# Patient Record
Sex: MALE | Employment: FULL TIME | URBAN - METROPOLITAN AREA
[De-identification: names, ages, dates, MRNs, and addresses within clinical notes are randomized per-mention and may not be internally consistent; named-entity substitution may affect disease eponyms.]

---

## 2022-10-21 ENCOUNTER — ANCILLARY PROCEDURE (OUTPATIENT)
Dept: ULTRASOUND IMAGING | Facility: CLINIC | Age: 42
End: 2022-10-21
Attending: PHYSICIAN ASSISTANT
Payer: COMMERCIAL

## 2022-10-21 ENCOUNTER — TRANSFERRED RECORDS (OUTPATIENT)
Dept: HEALTH INFORMATION MANAGEMENT | Facility: CLINIC | Age: 42
End: 2022-10-21

## 2022-10-21 DIAGNOSIS — R10.30 ABDOMINAL PAIN, LOWER: ICD-10-CM

## 2022-10-21 LAB
ALT SERPL-CCNC: 24 U/L (ref 16–63)
AST SERPL-CCNC: 18 U/L (ref 0–55)
CREATININE (EXTERNAL): 0.82 MG/DL (ref 0.7–1.3)
GLUCOSE (EXTERNAL): 75 MG/DL (ref 70–124)
POTASSIUM (EXTERNAL): 4.2 MMOL/L (ref 3.4–5.3)

## 2022-10-21 PROCEDURE — 76857 US EXAM PELVIC LIMITED: CPT | Mod: GC | Performed by: RADIOLOGY

## 2022-10-25 ENCOUNTER — MEDICAL CORRESPONDENCE (OUTPATIENT)
Dept: HEALTH INFORMATION MANAGEMENT | Facility: CLINIC | Age: 42
End: 2022-10-25

## 2022-10-25 ENCOUNTER — TRANSFERRED RECORDS (OUTPATIENT)
Dept: HEALTH INFORMATION MANAGEMENT | Facility: CLINIC | Age: 42
End: 2022-10-25

## 2022-10-26 ENCOUNTER — TRANSCRIBE ORDERS (OUTPATIENT)
Dept: OTHER | Age: 42
End: 2022-10-26

## 2022-10-26 DIAGNOSIS — R10.30 GROIN PAIN: Primary | ICD-10-CM

## 2022-11-07 NOTE — TELEPHONE ENCOUNTER
MEDICAL RECORDS REQUEST   [facility]  Urology Clinic  [address] [place] [address]  PHONE: [phone]  Fax: [phone]        FUTURE VISIT INFORMATION                                                   Vinay Cates, : 1980 scheduled for future visit at [facility]    APPOINTMENT INFORMATION:    Date: 11/15/2022    Provider:  Sarah Persaud CNP    Reason for Visit/Diagnosis: Discomfort With Ejaculation + Abdominal Discomfort    REFERRAL INFORMATION:    Referring provider:  Jax ECKERT @ [facility]    RECORDS REQUESTED FOR VISIT                                                     NOTES  STATUS/DETAILS   OFFICE NOTE from referring provider MEDIA TAB yes, 10/24/2022 -- Jax ECKERT @ [facility]   MEDICATION LIST  yes   LABS     URINALYSIS (UA)  no   IMAGING (IMAGES & REPORT)  yes, 10/21/2022 -- [facility]     PRE-VISIT CHECKLIST      Record collection complete Yes   Appointment appropriately scheduled           (right time/right provider) Yes   Joint diagnostic appointment coordinated correctly          (ensure right order & amount of time) Yes   MyChart activation Yes   Questionnaire complete If no, please explain PENDING

## 2022-11-08 ENCOUNTER — PRE VISIT (OUTPATIENT)
Dept: UROLOGY | Facility: CLINIC | Age: 42
End: 2022-11-08

## 2022-11-08 NOTE — TELEPHONE ENCOUNTER
Reason for visit: consult      Dx/Hx/Sx: pelvic pain with activity and ejaculation     Records/imaging/labs/orders: in epic     At Rooming: standard

## 2022-11-15 ENCOUNTER — PRE VISIT (OUTPATIENT)
Dept: UROLOGY | Facility: CLINIC | Age: 42
End: 2022-11-15

## 2022-11-15 ENCOUNTER — OFFICE VISIT (OUTPATIENT)
Dept: UROLOGY | Facility: CLINIC | Age: 42
End: 2022-11-15
Payer: COMMERCIAL

## 2022-11-15 VITALS
HEART RATE: 68 BPM | SYSTOLIC BLOOD PRESSURE: 152 MMHG | BODY MASS INDEX: 27.43 KG/M2 | DIASTOLIC BLOOD PRESSURE: 91 MMHG | HEIGHT: 68 IN | WEIGHT: 181 LBS

## 2022-11-15 DIAGNOSIS — R10.2 PELVIC PAIN: ICD-10-CM

## 2022-11-15 PROCEDURE — 99203 OFFICE O/P NEW LOW 30 MIN: CPT | Performed by: NURSE PRACTITIONER

## 2022-11-15 RX ORDER — SILDENAFIL 50 MG/1
TABLET, FILM COATED ORAL
COMMUNITY
Start: 2022-10-21

## 2022-11-15 ASSESSMENT — PAIN SCALES - GENERAL: PAINLEVEL: NO PAIN (0)

## 2022-11-15 NOTE — PROGRESS NOTES
Assessment and Plan:     42 year old male who has been experiencing left lower pelvic discomfort during ejaculation, which quickly ceases afterward, which has been present for the past several weeks. Recent pelvic US negative for hernia, and no signs of this on physical exam today. Comprehensive outside labs reviewed today, which were unremarkable. Suspect a muscle strain and recommend ongoing monitoring over the next 1-2 months, as these can heal slowly. He will notify me of his progress over time. If this pain continues, could consider obtaining additional imaging, including CT A/P. If pain does eventually resolve, he will follow up as needed.     Sarah Persaud, CNP  Department of Urology           Chief Complaint:   Pelvic pain          History of Present Illness:    Vinay Cates is a 42 year old male who presents for consult regarding left pelvic discomfort/pressure with ejaculation. This has been present for the past ~6 weeks. He describes this as a pressure sensation that occurs just prior to and during ejaculation, but then immediately ceases. No testicular pain or swelling, and no penile pain. No hematospermia. Some increased frequency and urgency of urination. In a monogamous relationship with wife and no concern for STIs/infection exposure.     He had a pelvic ultrasound done for this discomfort last month and was negative for hernia. Comprehensive labs done through [facility] at that time were unremarkable, including UA, PSA, CMP and CBC.     He is a new father and welcomed his first child, a daughter, three weeks ago.         Past Medical History:   No past medical history on file.         Past Surgical History:   No past surgical history on file.         Medications     Current Outpatient Medications   Medication     Multiple Vitamin (MULTIVITAMIN ADULT PO)     sildenafil (VIAGRA) 50 MG tablet     No current facility-administered medications for this visit.            Allergies:  "  Penicillins         Review of Systems:  From intake questionnaire   Negative 14 system review except as noted on HPI, nurse's note.         Physical Exam:   Patient is a 42 year old  male   Vitals: Blood pressure (!) 152/91, pulse 68, height 1.727 m (5' 8\"), weight 82.1 kg (181 lb).  General Appearance Adult: Alert, no acute distress, oriented  Lungs: no respiratory distress, or pursed lip breathing  Heart: No obvious jugular venous distension present  Abdomen: soft, nontender, no organomegaly or masses; no evidence of hernia Body mass index is 27.52 kg/m .  : deferred      Labs and Pathology:    I personally reviewed all applicable laboratory data from [facility] records and went over findings with patient       Imaging:    I personally reviewed all applicable imaging and went over findings with patient.  Significant for:    Results for orders placed or performed in visit on 10/21/22   US Pelvic Limited    Narrative    Exam: US PELVIC LIMITED, 10/21/2022 6:35 PM    Indication: looking for bilateral hernia; Abdominal pain, lower    Comparison: CT abdomen and pelvis, 2/13/2011    Findings:   Targeted grayscale and color Doppler sonographic examination of  bilateral groins demonstrated no sonographic evidence of herniation.  Benign-appearing lymph nodes in the left groin.      Impression    Impression: No sonographic evidence of inguinal hernia.    I have personally reviewed the examination and initial interpretation  and I agree with the findings.    NIGEL FISHER DO         SYSTEM ID:  [id number]     "

## 2022-11-15 NOTE — PATIENT INSTRUCTIONS
UROLOGY CLINIC VISIT PATIENT INSTRUCTIONS    Was very nice meeting you today! Keep an eye on symptoms for now and let me know how you're doing on MyChart. If symptoms remain and do not improve over time, could consider additional imaging.     If you have any issues, questions or concerns in the meantime, do not hesitate to contact us at [phone] or via [facility].     Sarah Persaud, CNP  Department of Urology

## 2022-11-15 NOTE — NURSING NOTE
"Chief Complaint   Patient presents with     Consult     Discomfort in lower abdomen with ejaculation and after physical activity, some urinary frequency both for past 6-8 weeks per pt       Blood pressure (!) 152/91, pulse 68, height 1.727 m (5' 8\"), weight 82.1 kg (181 lb). Body mass index is 27.52 kg/m .    There is no problem list on file for this patient.      Allergies   Allergen Reactions     Penicillins      vomiting       Current Outpatient Medications   Medication Sig Dispense Refill     Multiple Vitamin (MULTIVITAMIN ADULT PO)        sildenafil (VIAGRA) 50 MG tablet TAKE ONE Tablet (50mg) BY MOUTH EVERY DAY AS NEEDED         Social History     Tobacco Use     Smoking status: Former     Types: Cigarettes     Quit date:      Years since quittin.8     Smokeless tobacco: Never       Niurkaatiya Lee  11/15/2022  4:12 PM  "

## 2022-11-15 NOTE — LETTER
11/15/2022       RE: Vinay Cates  [address] [place] [address]     Dear Colleague,    Thank you for referring your patient, Vinay Cates, to the [facility] [place] [facility]. Please see a copy of my visit note below.         Assessment and Plan:     42 year old male who has been experiencing left lower pelvic discomfort during ejaculation, which quickly ceases afterward, which has been present for the past several weeks. Recent pelvic US negative for hernia, and no signs of this on physical exam today. Comprehensive outside labs reviewed today, which were unremarkable. Suspect a muscle strain and recommend ongoing monitoring over the next 1-2 months, as these can heal slowly. He will notify me of his progress over time. If this pain continues, could consider obtaining additional imaging, including CT A/P. If pain does eventually resolve, he will follow up as needed.     Sarah Persaud, CNP  Department of Urology           Chief Complaint:   Pelvic pain          History of Present Illness:    Vinay Cates is a 42 year old male who presents for consult regarding left pelvic discomfort/pressure with ejaculation. This has been present for the past ~6 weeks. He describes this as a pressure sensation that occurs just prior to and during ejaculation, but then immediately ceases. No testicular pain or swelling, and no penile pain. No hematospermia. Some increased frequency and urgency of urination. In a monogamous relationship with wife and no concern for STIs/infection exposure.     He had a pelvic ultrasound done for this discomfort last month and was negative for hernia. Comprehensive labs done through [facility] at that time were unremarkable, including UA, PSA, CMP and CBC.     He is a new father and welcomed his first child, a daughter, three weeks ago.         Past Medical History:   No past  "medical history on file.         Past Surgical History:   No past surgical history on file.         Medications     Current Outpatient Medications   Medication     Multiple Vitamin (MULTIVITAMIN ADULT PO)     sildenafil (VIAGRA) 50 MG tablet     No current facility-administered medications for this visit.            Allergies:   Penicillins         Review of Systems:  From intake questionnaire   Negative 14 system review except as noted on HPI, nurse's note.         Physical Exam:   Patient is a 42 year old  male   Vitals: Blood pressure (!) 152/91, pulse 68, height 1.727 m (5' 8\"), weight 82.1 kg (181 lb).  General Appearance Adult: Alert, no acute distress, oriented  Lungs: no respiratory distress, or pursed lip breathing  Heart: No obvious jugular venous distension present  Abdomen: soft, nontender, no organomegaly or masses; no evidence of hernia Body mass index is 27.52 kg/m .  : deferred      Labs and Pathology:    I personally reviewed all applicable laboratory data from [facility] records and went over findings with patient       Imaging:    I personally reviewed all applicable imaging and went over findings with patient.  Significant for:    Results for orders placed or performed in visit on 10/21/22   US Pelvic Limited    Narrative    Exam: US PELVIC LIMITED, 10/21/2022 6:35 PM    Indication: looking for bilateral hernia; Abdominal pain, lower    Comparison: CT abdomen and pelvis, 2/13/2011    Findings:   Targeted grayscale and color Doppler sonographic examination of  bilateral groins demonstrated no sonographic evidence of herniation.  Benign-appearing lymph nodes in the left groin.      Impression    Impression: No sonographic evidence of inguinal hernia.    I have personally reviewed the examination and initial interpretation  and I agree with the findings.    NIGEL FISHER DO         SYSTEM ID:  [id number]       "

## 2023-03-29 ENCOUNTER — OFFICE VISIT (OUTPATIENT)
Dept: FAMILY MEDICINE | Facility: CLINIC | Age: 43
End: 2023-03-29
Payer: COMMERCIAL

## 2023-03-29 VITALS
DIASTOLIC BLOOD PRESSURE: 81 MMHG | HEART RATE: 75 BPM | BODY MASS INDEX: 28.72 KG/M2 | OXYGEN SATURATION: 98 % | HEIGHT: 68 IN | RESPIRATION RATE: 12 BRPM | TEMPERATURE: 97.9 F | WEIGHT: 189.5 LBS | SYSTOLIC BLOOD PRESSURE: 136 MMHG

## 2023-03-29 DIAGNOSIS — Z23 NEED FOR IMMUNIZATION AGAINST TYPHOID: ICD-10-CM

## 2023-03-29 DIAGNOSIS — Z71.84 ENCOUNTER FOR COUNSELING FOR TRAVEL: Primary | ICD-10-CM

## 2023-03-29 DIAGNOSIS — Z23 NEED FOR HEPATITIS B VACCINATION: ICD-10-CM

## 2023-03-29 PROCEDURE — 90691 TYPHOID VACCINE IM: CPT | Mod: GA | Performed by: PHYSICIAN ASSISTANT

## 2023-03-29 PROCEDURE — 90472 IMMUNIZATION ADMIN EACH ADD: CPT | Mod: GA | Performed by: PHYSICIAN ASSISTANT

## 2023-03-29 PROCEDURE — 90471 IMMUNIZATION ADMIN: CPT | Mod: GA | Performed by: PHYSICIAN ASSISTANT

## 2023-03-29 PROCEDURE — 99401 PREV MED CNSL INDIV APPRX 15: CPT | Mod: 25 | Performed by: PHYSICIAN ASSISTANT

## 2023-03-29 PROCEDURE — 90746 HEPB VACCINE 3 DOSE ADULT IM: CPT | Mod: GA | Performed by: PHYSICIAN ASSISTANT

## 2023-03-29 RX ORDER — AZITHROMYCIN 500 MG/1
TABLET, FILM COATED ORAL
Qty: 3 TABLET | Refills: 0 | Status: SHIPPED | OUTPATIENT
Start: 2023-03-29

## 2023-03-29 RX ORDER — ATOVAQUONE AND PROGUANIL HYDROCHLORIDE 250; 100 MG/1; MG/1
1 TABLET, FILM COATED ORAL DAILY
Qty: 18 TABLET | Refills: 0 | Status: SHIPPED | OUTPATIENT
Start: 2023-03-29

## 2023-03-29 NOTE — PROGRESS NOTES
SUBJECTIVE: Vinay Cates , a 42 year old  male, presents for counseling and information regarding upcoming travel to [facility]. Special medical concerns include: none. He anticipates the following unusual exposures: none.    Itinerary:  [facility]    Departure Date: 3/29/23 Return date: 4/8/23    Reason for travel (i.e. Business, pleasure): conference    Visiting an urban or rural area?: urban    Accommodations (i.e. hotel, hostel, friends, family, etc): hotel      IMMUNIZATION HISTORY  Have you received any vaccinations in the past 4 weeks?  No  Have you ever fainted from having your blood drawn or from an injection?  No  Have you ever had a fever reaction to vaccination?  No  Have you ever had any bad reaction or side effect from any vaccination?  No  Have you ever had hepatitis A or B vaccine?  yes  Do you live (or work closely) with anyone who has AIDS, an AIDS-like condition, any other immune disorder or who is on chemotherapy for cancer?  No  Have you received any injection of immune globulin or any blood products during the past 12 months?  No    GENERAL MEDICAL HISTORY  Do you have a medical condition that warrants maintenance medication or physician follow-up?  No  Do you have a medical condition that is stable now, but that may recur while traveling?  No  Has your spleen been removed?  No  Have you had an acute illness or a fever in the past 48 hours?  No  Are you pregnant, or might you become pregnant on this trip?  Any chance of pregnancy?  No  Are you breastfeeding?  No  Do you have HIV, AIDS, an AIDS-like condition, any other immune disorder, leukemia or cancer?  No  Do you have a severe combined immunodeficiency disease?  No  Have you had your thymus gland removed or history of problems with your thymus, such as myasthenia gravis, DiGeorge syndrome, or thymoma?  No    Do you have severe thrombocytopenia (low platelet count) or a coagulation disorder?  No  Have you ever had a convulsion, seizure,  epilepsy, neurologic condition or brain infection?  No  Do you have any stomach conditions?  No  Do you have a G6PD deficiency?  No  Do you have severe renal or kidney impairment?  No  Do you have a history of psychiatric problems?  No  Do you have a problem with strange dreams and/or nightmares?  No  Do you have insomnia?  No  Do you have problems with vaginitis?  No  Do you have psoriasis?  No  Are you prone to motion sickness?  yes  Have you ever had headaches, nausea, vomiting, or breathing problems from altitude exposure?  yes      No past medical history on file.   Immunization History   Administered Date(s) Administered     COVID-19 Vaccine 18+ (Moderna) 03/31/2021, 04/24/2021     COVID-19 Vaccine Bivalent Booster 12+ (Pfizer) 10/19/2022       Current Outpatient Medications   Medication Sig Dispense Refill     Multiple Vitamin (MULTIVITAMIN ADULT PO)        sildenafil (VIAGRA) 50 MG tablet TAKE ONE Tablet (50mg) BY MOUTH EVERY DAY AS NEEDED       Allergies   Allergen Reactions     Penicillins      vomiting        EXAM: deferred    Immunizations discussed include: Hepatitis B and Typhoid  Malaria prophylaxis recommended: Malarone  Symptomatic treatment for traveler's diarrhea: bismuth subsalicylate, loperamide/diphenoxylate and azithromycin    ASSESSMENT/PLAN:    (Z71.84) Encounter for counseling for travel  (primary encounter diagnosis)    Comment: Typhoid and Hep B vaccines today. Patient will return or follow-up with PCP as needed. Prophylaxis given for Traveler's diarrhea and Malaria. All questions were answered.     Plan: atovaquone-proguanil (MALARONE) 250-100 MG         tablet, azithromycin (ZITHROMAX) 500 MG tablet            (Z23) Need for immunization against typhoid  Comment:   Plan: TYPHOID VACCINE, IM            (Z23) Need for hepatitis B vaccination  Comment:   Plan: HEPATITIS B VACCINE, ADULT, IM              I have reviewed general recommendations for safe travel   including: food/water  precautions, insect avoidance, safe sex   practices given high prevalence of HIV and other STDs,   roadway safety. Educational materials and links to the CDC   Traveler's health website have been provided.    Total time 15 minutes, greater than 50 percent in counseling   and coordination of care.

## 2023-03-29 NOTE — PATIENT INSTRUCTIONS
"See travel packet provided  Recommend ultrathon (mosquito repellant), pepto bismol and imodium  The food and drink choices you make while traveling can impact your likelihood of getting sick.   If you aren't sure if a food or drink is safe, the saying \" BOIL IT, COOK IT, PEEL IT, OR FORGET IT\" can help you decide whether it's okay to consume.   Also bring hand  and sun screen with you.  Safe Travels       Today March 29, 2023 you received the    Hepatitis B Vaccine - This is your final dose.    Typhoid - injectable. This vaccine is valid for two years. .    These appointments can be made as a NURSE ONLY visit.    **It is very important for the vaccinations to be given on the scheduled day(s), this helps ensure you receive the full effectiveness of the vaccine.**    Please call [facility] with any questions [phone]    Thank you for visiting [facility]    "

## 2023-04-23 ENCOUNTER — HEALTH MAINTENANCE LETTER (OUTPATIENT)
Age: 43
End: 2023-04-23

## 2024-06-30 ENCOUNTER — HEALTH MAINTENANCE LETTER (OUTPATIENT)
Age: 44
End: 2024-06-30

## 2025-06-17 ENCOUNTER — MEDICAL CORRESPONDENCE (OUTPATIENT)
Dept: HEALTH INFORMATION MANAGEMENT | Facility: CLINIC | Age: 45
End: 2025-06-17
Payer: COMMERCIAL

## 2025-06-17 ENCOUNTER — TRANSCRIBE ORDERS (OUTPATIENT)
Dept: OTHER | Age: 45
End: 2025-06-17

## 2025-06-17 DIAGNOSIS — K92.1 BLOODY STOOL: Primary | ICD-10-CM

## 2025-06-28 ENCOUNTER — OFFICE VISIT (OUTPATIENT)
Dept: URGENT CARE | Facility: URGENT CARE | Age: 45
End: 2025-06-28
Payer: COMMERCIAL

## 2025-06-28 ENCOUNTER — NURSE TRIAGE (OUTPATIENT)
Dept: NURSING | Facility: CLINIC | Age: 45
End: 2025-06-28
Payer: COMMERCIAL

## 2025-06-28 VITALS
OXYGEN SATURATION: 98 % | RESPIRATION RATE: 16 BRPM | BODY MASS INDEX: 27.58 KG/M2 | DIASTOLIC BLOOD PRESSURE: 74 MMHG | HEIGHT: 68 IN | WEIGHT: 182 LBS | SYSTOLIC BLOOD PRESSURE: 126 MMHG | TEMPERATURE: 100.3 F | HEART RATE: 94 BPM

## 2025-06-28 DIAGNOSIS — J02.0 STREP THROAT: Primary | ICD-10-CM

## 2025-06-28 LAB — DEPRECATED S PYO AG THROAT QL EIA: POSITIVE

## 2025-06-28 PROCEDURE — 99213 OFFICE O/P EST LOW 20 MIN: CPT | Performed by: FAMILY MEDICINE

## 2025-06-28 PROCEDURE — 3074F SYST BP LT 130 MM HG: CPT | Performed by: FAMILY MEDICINE

## 2025-06-28 PROCEDURE — 3078F DIAST BP <80 MM HG: CPT | Performed by: FAMILY MEDICINE

## 2025-06-28 PROCEDURE — 87880 STREP A ASSAY W/OPTIC: CPT | Performed by: FAMILY MEDICINE

## 2025-06-28 RX ORDER — CEFDINIR 300 MG/1
600 CAPSULE ORAL DAILY
Qty: 20 CAPSULE | Refills: 0 | Status: SHIPPED | OUTPATIENT
Start: 2025-06-28 | End: 2025-07-08

## 2025-06-28 NOTE — PATIENT INSTRUCTIONS
Antibiotic: Cefdinir once a day for 10 days       Replace toothbrush in about 3 days ( to prevent future re-infection)  -If it is an electric brush you can soak it in rubbing/isopropyl alcohol or hydrogen peroxide for 1 minute then rinse under water.         Acetaminophen and/or ibuprofen (if allowed to take) for fever/discomfort every 4 hours as needed

## 2025-06-28 NOTE — TELEPHONE ENCOUNTER
"Triage call  Patient calling Fever since yesterday sore throat  Thursday eveneing 6/26/2025 his fever last night was  101.2 and now this morning it is 100.4.  He rates the pain a 2/10  and he can see white spots on the back of his throat.    Per protocol see PCP with in 24 hours.  Care advice given.  Verbalizes understanding and agrees with plan. He will go to  [place] urgent [facility]    Cheryle Hankins RN   [facility] Nurse Advisor  9:57 AM 6/28/2025    Reason for Disposition   [1] Pus on tonsils (back of throat) AND [2]  fever AND [3] swollen neck lymph nodes (\"glands\")    Additional Information   Negative: SEVERE difficulty breathing (e.g., struggling for each breath, speaks in single words, stridor)   Negative: Sounds like a life-threatening emergency to the triager   Negative: [1] Diagnosed strep throat AND [2] taking antibiotic AND [3] symptoms continue   Negative: Throat culture results, call about   Negative: Productive cough is main symptom   Negative: Non-productive cough is main symptom   Negative: Hoarseness is main symptom   Negative: Runny nose is main symptom   Negative: Uvula swelling is main symptom   Negative: [1] Drooling or spitting out saliva (because can't swallow) AND [2] normal breathing   Negative: Unable to open mouth completely   Negative: [1] Difficulty breathing AND [2] not severe   Negative: Fever > 104 F (40 C)   Negative: [1] Refuses to drink anything AND [2] for > 12 hours   Negative: [1] Drinking very little AND [2] dehydration suspected (e.g., no urine > 12 hours, very dry mouth, very lightheaded)   Negative: Patient sounds very sick or weak to the triager   Negative: SEVERE (e.g., excruciating) throat pain    Protocols used: Sore Throat-A-AH    "

## 2025-06-28 NOTE — PROGRESS NOTES
Assessment & Plan     Strep throat    - Streptococcus A Rapid Screen w/Reflex to PCR - Clinic Collect  - cefdinir (OMNICEF) 300 MG capsule  Dispense: 20 capsule; Refill: 0     No signs of peritonsillar abscess  Will proceed with cefdinir 600 mg once daily for 10 days we discussed there is a small chance with this progress activity to penicillin that he develops a rash but alternative medication such as azithromycin have significant drug resistance now for group A strep.     Steve Mederos MD   [facility]    Aggie Mclean is a 45 year old male who presents to clinic today for the following health issues:  Chief Complaint   Patient presents with    Sick     S, femp x Thursday -- took Tylenol at 930am today     HPI  No others with strep throat in the family his daughter did have cough and respiratory symptoms with mild sore throat which resolved  Patient having symptoms starting in the last couple days took Tylenol which has been significantly helpful  Still has tonsils.  No rash of the body.  Denies any congestion or cough  There are no active problems to display for this patient.      Current Outpatient Medications   Medication Sig Dispense Refill    cefdinir (OMNICEF) 300 MG capsule Take 2 capsules (600 mg) by mouth daily for 10 days. 20 capsule 0    Multiple Vitamin (MULTIVITAMIN ADULT PO)       sildenafil (VIAGRA) 50 MG tablet TAKE ONE Tablet (50mg) BY MOUTH EVERY DAY AS NEEDED      atovaquone-proguanil (MALARONE) 250-100 MG tablet Take 1 tablet by mouth daily Start 2 days before travel and continue 7 days after return. (Patient not taking: Reported on 6/28/2025) 18 tablet 0    azithromycin (ZITHROMAX) 500 MG tablet Take one tablet daily for up to 3 days as needed for traveler's diarrhea (Patient not taking: Reported on 6/28/2025) 3 tablet 0     No current facility-administered medications for this visit.           Objective    /74 (BP Location: Right arm, Patient Position: Chair,  "Cuff Size: Adult Regular)   Pulse 94   Temp 100.3  F (37.9  C) (Oral)   Resp 16   Ht 1.727 m (5' 8\")   Wt 82.6 kg (182 lb)   SpO2 98%   BMI 27.67 kg/m    Physical Exam   As noted above and including:   GEN: No acute distress, pleasant, good historian  Erythematous mildly enlarged tonsils bilaterally without any exudates no trismus uvula midline  Results for orders placed or performed in visit on 06/28/25   Streptococcus A Rapid Screen w/Reflex to PCR - Clinic Collect     Status: Abnormal    Specimen: Throat; Swab   Result Value Ref Range    Group A Strep antigen Positive (A) Negative                     The use of Dragon/Satago dictation services may have been used to construct the content in this note; any grammatical or spelling errors are non-intentional. Please contact the author of this note directly if you are in need of any clarification.   "

## 2025-06-28 NOTE — PROGRESS NOTES
Urgent Care Clinic Visit    Chief Complaint   Patient presents with    Sick     S, femp x Thursday -- took Tylenol at 930am today               6/28/2025    11:45 AM   Additional Questions   Roomed by bill   Accompanied by self

## 2025-07-03 ENCOUNTER — TELEPHONE (OUTPATIENT)
Dept: GASTROENTEROLOGY | Facility: CLINIC | Age: 45
End: 2025-07-03
Payer: COMMERCIAL

## 2025-07-03 NOTE — TELEPHONE ENCOUNTER
Pre assessment completed for upcoming procedure.   (Please see previous telephone encounter notes for complete details)      Procedure details:    Procedure date 7.21.2025, arrival time 1030 and facility location reviewed.    Pre op exam needed? No.    Designated  policy reviewed. Instructed to have someone stay 6  hours post procedure.       Medication review:    Medications reviewed. Please see supporting documentation below. Holding recommendations discussed (if applicable).       Prep for procedure:     Procedure prep instructions reviewed.        Any additional information needed:  N/A      Patient verbalized understanding and had no questions or concerns at this time.      Faby Gonzalez RN  Endoscopy Procedure Pre Assessment   [phone] option 3

## 2025-07-03 NOTE — TELEPHONE ENCOUNTER
Pre visit planning completed.      Procedure details:    Patient scheduled for Colonoscopy on 7/21/25.     Arrival time: 1030. Procedure time 1115    Facility location: [facility]; [facility] [place] [address]. Check in location: 1st Floor [facility].     Sedation type: Conscious sedation     Pre op exam needed? No.    Indication for procedure: bloody stool       Chart review:     Electronic implanted devices? No    Recent diagnosis of diverticulitis within the last 6 weeks? No      Medication review:    Diabetic? No    Anticoagulants? No    Weight loss medication/injectable? No GLP-1 medication per patient's medication list. Nursing to verify with pre-assessment call.    Other medication HOLDING recommendations:  N/A      Prep for procedure:     Bowel prep recommendation: Standard Miralax.   Due to: standard bowel prep    Procedure information and instructions sent via Queue-itpamella Valdivia RN  Endoscopy Procedure Pre Assessment   [phone] option 3

## 2025-07-13 ENCOUNTER — HEALTH MAINTENANCE LETTER (OUTPATIENT)
Age: 45
End: 2025-07-13

## 2025-07-21 ENCOUNTER — HOSPITAL ENCOUNTER (OUTPATIENT)
Facility: CLINIC | Age: 45
Discharge: HOME | End: 2025-07-21
Attending: SURGERY | Admitting: SURGERY
Payer: COMMERCIAL

## 2025-07-21 VITALS
RESPIRATION RATE: 13 BRPM | OXYGEN SATURATION: 97 % | SYSTOLIC BLOOD PRESSURE: 137 MMHG | DIASTOLIC BLOOD PRESSURE: 91 MMHG | HEART RATE: 61 BPM

## 2025-07-21 LAB — COLONOSCOPY: NORMAL

## 2025-07-21 PROCEDURE — G0500 MOD SEDAT ENDO SERVICE >5YRS: HCPCS | Performed by: SURGERY

## 2025-07-21 PROCEDURE — 250N000011 HC RX IP 250 OP 636: Performed by: SURGERY

## 2025-07-21 PROCEDURE — 88305 TISSUE EXAM BY PATHOLOGIST: CPT | Mod: 26 | Performed by: PATHOLOGY

## 2025-07-21 PROCEDURE — 99153 MOD SED SAME PHYS/QHP EA: CPT | Performed by: SURGERY

## 2025-07-21 PROCEDURE — 88305 TISSUE EXAM BY PATHOLOGIST: CPT | Mod: TC | Performed by: SURGERY

## 2025-07-21 PROCEDURE — 45385 COLONOSCOPY W/LESION REMOVAL: CPT | Mod: PT | Performed by: SURGERY

## 2025-07-21 RX ORDER — LIDOCAINE 40 MG/G
CREAM TOPICAL
Status: DISCONTINUED | OUTPATIENT
Start: 2025-07-21 | End: 2025-07-21 | Stop reason: HOSPADM

## 2025-07-21 RX ORDER — NALOXONE HYDROCHLORIDE 0.4 MG/ML
0.4 INJECTION, SOLUTION INTRAMUSCULAR; INTRAVENOUS; SUBCUTANEOUS
Status: DISCONTINUED | OUTPATIENT
Start: 2025-07-21 | End: 2025-07-21 | Stop reason: HOSPADM

## 2025-07-21 RX ORDER — ONDANSETRON 2 MG/ML
4 INJECTION INTRAMUSCULAR; INTRAVENOUS EVERY 6 HOURS PRN
Status: DISCONTINUED | OUTPATIENT
Start: 2025-07-21 | End: 2025-07-21 | Stop reason: HOSPADM

## 2025-07-21 RX ORDER — ONDANSETRON 4 MG/1
4 TABLET, ORALLY DISINTEGRATING ORAL EVERY 6 HOURS PRN
Status: DISCONTINUED | OUTPATIENT
Start: 2025-07-21 | End: 2025-07-21 | Stop reason: HOSPADM

## 2025-07-21 RX ORDER — FLUMAZENIL 0.1 MG/ML
0.2 INJECTION, SOLUTION INTRAVENOUS
Status: DISCONTINUED | OUTPATIENT
Start: 2025-07-21 | End: 2025-07-21 | Stop reason: HOSPADM

## 2025-07-21 RX ORDER — ONDANSETRON 2 MG/ML
4 INJECTION INTRAMUSCULAR; INTRAVENOUS
Status: DISCONTINUED | OUTPATIENT
Start: 2025-07-21 | End: 2025-07-21 | Stop reason: HOSPADM

## 2025-07-21 RX ORDER — PROCHLORPERAZINE MALEATE 10 MG
10 TABLET ORAL EVERY 6 HOURS PRN
Status: DISCONTINUED | OUTPATIENT
Start: 2025-07-21 | End: 2025-07-21 | Stop reason: HOSPADM

## 2025-07-21 RX ORDER — NALOXONE HYDROCHLORIDE 0.4 MG/ML
0.2 INJECTION, SOLUTION INTRAMUSCULAR; INTRAVENOUS; SUBCUTANEOUS
Status: DISCONTINUED | OUTPATIENT
Start: 2025-07-21 | End: 2025-07-21 | Stop reason: HOSPADM

## 2025-07-21 RX ORDER — FENTANYL CITRATE 50 UG/ML
INJECTION, SOLUTION INTRAMUSCULAR; INTRAVENOUS PRN
Status: DISCONTINUED | OUTPATIENT
Start: 2025-07-21 | End: 2025-07-21 | Stop reason: HOSPADM

## 2025-07-21 ASSESSMENT — ACTIVITIES OF DAILY LIVING (ADL)
ADLS_ACUITY_SCORE: 41
ADLS_ACUITY_SCORE: 41

## 2025-07-21 NOTE — H&P
Pre-Endoscopy History and Physical     Vinay Cates MRN# [id number]   YOB: 1980 Age: 45 year old     Date of Procedure: 25  Primary care provider: [facility]  Type of Endoscopy: colonoscopy  Reason for Procedure: Average risk for screening     Type of Anesthesia Anticipated: Moderate Sedation    HPI:    Vinay is a 45 year old male who will be undergoing the above procedure.      A history and physical has been performed. The patient's medications and allergies have been reviewed. The risks and benefits of the procedure including the risk of bleeding, perforation, and missed lesions as well as the sedation options and risks were discussed with the patient.  All questions were answered and informed consent was obtained.        Last Colonoscopy:Had a colonoscopy 20 years ago in [place] for bleeding.   Family History of Colorectal Cancer: No FH CRC  Allergies   Allergen Reactions    Penicillins      vomiting        No current facility-administered medications for this encounter.     Current Outpatient Medications   Medication Sig Dispense Refill    atovaquone-proguanil (MALARONE) 250-100 MG tablet Take 1 tablet by mouth daily Start 2 days before travel and continue 7 days after return. (Patient not taking: Reported on 2025) 18 tablet 0    azithromycin (ZITHROMAX) 500 MG tablet Take one tablet daily for up to 3 days as needed for traveler's diarrhea (Patient not taking: Reported on 2025) 3 tablet 0    Multiple Vitamin (MULTIVITAMIN ADULT PO)       sildenafil (VIAGRA) 50 MG tablet TAKE ONE Tablet (50mg) BY MOUTH EVERY DAY AS NEEDED         No past medical history on file.     No past surgical history on file.    Social History     Tobacco Use    Smoking status: Former     Current packs/day: 0.00     Types: Cigarettes     Quit date:      Years since quittin.5    Smokeless tobacco: Never   Substance Use Topics    Alcohol use: Not on file       No family  "history on file. No CRC or IBD.      PHYSICAL EXAM:   Vital signs:      BP: 126/83 Pulse: 65   Resp: 14 SpO2: 96 % O2 Device: None (Room air)        Estimated body mass index is 27.67 kg/m  as calculated from the following:    Height as of 6/28/25: 1.727 m (5' 8\").    Weight as of 6/28/25: 82.6 kg (182 lb).  Mental status - alert and oriented  RESP: lungs clear  CV: RRR  AIRWAY EXAM: Mallampatti Class I (visualization of the soft palate, fauces, uvula, anterior and posterior pillars)    IMPRESSION   ASA Class 2 - Mild systemic disease      Adrianne Goode MD    [facility]  [address] [place] [facility]  [place] [address]  T: [phone]  F: [phone]  Pager: [phone]      For questions/paging, please contact the [facility] office at [phone].       7/21/2025  11:56 AM              "

## 2025-07-22 LAB
PATH REPORT.COMMENTS IMP SPEC: NORMAL
PATH REPORT.COMMENTS IMP SPEC: NORMAL
PATH REPORT.FINAL DX SPEC: NORMAL
PATH REPORT.GROSS SPEC: NORMAL
PATH REPORT.MICROSCOPIC SPEC OTHER STN: NORMAL
PATH REPORT.RELEVANT HX SPEC: NORMAL
PHOTO IMAGE: NORMAL

## (undated) RX ORDER — FENTANYL CITRATE 50 UG/ML
INJECTION, SOLUTION INTRAMUSCULAR; INTRAVENOUS
Status: DISPENSED
Start: 2025-07-21